# Patient Record
Sex: MALE | Race: WHITE | NOT HISPANIC OR LATINO | Employment: FULL TIME | URBAN - METROPOLITAN AREA
[De-identification: names, ages, dates, MRNs, and addresses within clinical notes are randomized per-mention and may not be internally consistent; named-entity substitution may affect disease eponyms.]

---

## 2021-10-03 ENCOUNTER — OFFICE VISIT (OUTPATIENT)
Dept: URGENT CARE | Facility: URGENT CARE | Age: 46
End: 2021-10-03
Payer: COMMERCIAL

## 2021-10-03 ENCOUNTER — TELEPHONE (OUTPATIENT)
Dept: NURSING | Facility: CLINIC | Age: 46
End: 2021-10-03

## 2021-10-03 VITALS
HEART RATE: 71 BPM | SYSTOLIC BLOOD PRESSURE: 177 MMHG | OXYGEN SATURATION: 99 % | DIASTOLIC BLOOD PRESSURE: 122 MMHG | TEMPERATURE: 98.5 F | WEIGHT: 235 LBS

## 2021-10-03 DIAGNOSIS — B35.3 TINEA PEDIS OF BOTH FEET: Primary | ICD-10-CM

## 2021-10-03 DIAGNOSIS — L03.032 CELLULITIS OF TOE OF LEFT FOOT: ICD-10-CM

## 2021-10-03 DIAGNOSIS — B35.1 ONYCHOMYCOSIS DUE TO DERMATOPHYTE: ICD-10-CM

## 2021-10-03 DIAGNOSIS — R03.0 ELEVATED BP WITHOUT DIAGNOSIS OF HYPERTENSION: ICD-10-CM

## 2021-10-03 PROCEDURE — 99204 OFFICE O/P NEW MOD 45 MIN: CPT | Performed by: INTERNAL MEDICINE

## 2021-10-03 RX ORDER — TERBINAFINE HYDROCHLORIDE 250 MG/1
250 TABLET ORAL DAILY
Qty: 30 TABLET | Refills: 0 | Status: SHIPPED | OUTPATIENT
Start: 2021-10-03 | End: 2021-11-02

## 2021-10-03 RX ORDER — CEPHALEXIN 500 MG/1
500 CAPSULE ORAL 2 TIMES DAILY
Qty: 21 CAPSULE | Refills: 0 | Status: SHIPPED | OUTPATIENT
Start: 2021-10-03 | End: 2021-10-10

## 2021-10-03 NOTE — PROGRESS NOTES
ASSESSMENT AND PLAN:      ICD-10-CM    1. Tinea pedis of both feet  B35.3 terbinafine (LAMISIL) 250 MG tablet   2. Cellulitis of toe of left foot  L03.032 cephALEXin (KEFLEX) 500 MG capsule   3. Onychomycosis due to dermatophyte  B35.1 terbinafine (LAMISIL) 250 MG tablet   4. Elevated BP without diagnosis of hypertension  R03.0      Intermittent tinea pedis of both feet with usually topical treatments with current symptoms being greater than 3 weeks and worsening.  Concern with bacterial secondary infection.  Cellulitis  Discussed will be difficult to treat tinea pedis chronically especially if he has involvement of toenails.    Discussed importance of having dry dry feet and airing out with his recurrence of athlete's foot.  Recommended wicking socks such as dry wool socks. avoiding cotton socks    Recommend following up with primary provider within a month especially if he would like to consider treatment of toenails.    Discussed provider may wish to do toenail fungal culture.    During charting process I noted his initial and recheck blood pressure both were elevated.  I called and discussed with patient.  He states his blood pressures have been elevated the past year.  Recommended low-salt, low caffeine and exercise.  Discussed blood pressures are very high and he may need to start medication.  Discussed this can be addressed at recheck appointment in 1 month with his primary at [place]    PLAN:  Terbinafine, Keflex        Patient Instructions       Recheck with primary to consider / discuss treatment for fungal toe nail          Patient Education     Nail Fungal Infection  A nail fungal infection changes the way fingernails and toenails look. They may thicken, discolor, change shape, or split. This condition is hard to treat because nails grow slowly and have limited blood supply. The infection often comes back after treatment.   There are 2 types of medicines used to treat this condition:     Antifungal  medicines for the skin (topical).  These are applied to the skin and nail area. These medicines don't work well because they can t get deep into the nail.    Oral antifungal medicines. These medicines work better because they go into the nail from the inside out. But the infection may still come back. It may take 9 to 12 months for your nail to look normal again. This means you are cured. You can repeat treatment if needed. Most people take these medicines without any problems. It's rare to stop therapy because of side effects. But your healthcare provider may give you some monitoring tests. Talk about possible side effects with your provider before starting treatment.  If medicines fail, the nail can be removed surgically or chemically. These methods physically remove the fungus from the body. This helps medical treatment be more effective.   If the changes in your nails are not bothering you, you may not need treatment. Discuss this with your healthcare provider.   Home care    Use medicines exactly as directed for as long as directed. Treating a fungal infection can take longer than other kinds of infections.    Smoking is a risk factor for fungal infection. This is one more reason to quit.    Wear absorbent socks, and shoes that let your feet breathe. Sweaty feet increase your risk of fungal infection. They also make an existing infection harder to treat.    Use footwear when in damp public places like swimming pools, gyms, and shower rooms. This will help you stay away from the fungus that grows there.    Don't share nail clippers or scissors with others.    Follow-up care  Follow up with your healthcare provider, or as advised.  When to seek medical advice  Call your healthcare provider right away if any of these occur:    Skin by the nail becomes red, swollen, painful, or drains pus (a creamy yellow or white liquid)    Side effects from oral anti-fungal medicines  Yassine last reviewed this educational  content on 7/1/2019 2000-2021 [employer]. All rights reserved. This information is not intended as a substitute for professional medical care. Always follow your healthcare professional's instructions.           Patient Education     Athlete s Foot     Athlete s foot (tinea pedis) is caused by a fungal infection in the skin. It affects the skin between the toes, causing cracks in the skin called fissures. It can also affect the bottom of the foot where it causes dry white scales and peeling of the skin. This infection is more likely to occur when the foot is in hot, sweaty socks and shoes for long periods of time. You may feel itching and burning between your toes. This infection is treated with skin creams or medicine taken by mouth.  Home care  The following are general care guidelines:    It's important to keep the feet dry. Use absorbent cotton socks and change them if they become sweaty. Or wear an open-toe shoe or sandal. Wash the feet at least once a day with soap and water.    Apply the antifungal cream as prescribed. Some antifungal creams are available without a prescription.    It may take a week before the rash starts to improve. It can take about 3 to 4 weeks to completely clear. Continue the medicine until the rash is all gone.    Use over-the-counter antifungal powders or sprays on your feet after exposure to high-risk environments, such as public showers, gyms, and locker rooms. This can help prevent future infections. Wearing appropriate shoes in these situations can help.  Prevention  These tips may help prevent athlete s foot:    Don't share shoes or socks with someone who has athlete's foot.    Don't walk barefoot in places where a fungal infection can spread quickly such as locker rooms, showers, and swimming pools.    Change your socks regularly.    Alternate shoes to help with drying.  Follow-up care  Follow up with your healthcare provider as recommended if the rash doesn't  improve after 10 days of treatment, or if the rash continues to spread.  When to seek medical care  Get medical attention right away if any of the following occur:    Fever of 100.4 F (38 C) or higher, or as directed    Increasing redness or swelling of the foot    Infection comes back soon after treatment    Pus draining from cracks in the skin  Yassine last reviewed this educational content on 7/1/2019 2000-2021 [employer]. All rights reserved. This information is not intended as a substitute for professional medical care. Always follow your healthcare professional's instructions.             Return in about 1 month (around 11/3/2021).        Elba Castro MD  [facility]    Subjective     Frankie Carrizales is a 46 year old who presents for Patient presents with:  Urgent Care  Foot Problems: c/o foot itching for 3 weeks    a new patient of [facility].    Rash    Onset of bilateral foot rash was 3 week(s) ago.   Current and Associated symptoms: itching, burning, painful and red   Location of the rash: feet/between toes and involving nails.  Previous history of a similar rash? Yes and History of athlete's foot off and on  Treatment measures tried include: Has tried over-the-counter antifungals and did a E-visit for prescription RX antifungal cream  Course of illness is worsening.    Now he has scabs and redness involving some of the toes.          Objective    BP (!) 177/122   Pulse 71   Temp 98.5  F (36.9  C) (Tympanic)   Wt 106.6 kg (235 lb)   SpO2 99%   Physical Exam  Vitals reviewed.   Constitutional:       Appearance: Normal appearance.   Musculoskeletal:      Comments: bilateral feet    Interdigital webbing space reveals redness and maceration consistent with tinea.    As noted in pictures below redness also extends to the extensor areas of some of the toes.    Left toe is worse than right and there is a large macerated scab on the middle left toe with  surrounding redness.    Yellow onycholysis noted on first through third toenail of left foot.   Neurological:      Mental Status: He is alert.

## 2021-10-03 NOTE — TELEPHONE ENCOUNTER
Pharmacy calling requesting clarification on the pt's Cephalexin Rx that was just sent over by Dr Castro, at [facility].    The Rx reads as follows:    cephALEXin (KEFLEX) 500 MG capsule 21 capsule 0 10/3/2021 10/10/2021 --   Sig - Route: Take 1 capsule (500 mg) by mouth 2 times daily for 7 days - Oral   Sent to pharmacy as: Cephalexin 500 MG Oral Capsule (KEFLEX)   Class: E-Prescribe       Pharmacist wants to clarify on sig, as the Rx was written as take 1 capsule BID x 7 days, with a # of pills at 21.    I called the , and Dr Castro verified that the sig should read TID, not BID.    Pharmacist notified.  Angela Geller RN 10/03/21 2:10 PM  [facility] Nurse Advisor
